# Patient Record
Sex: FEMALE | Race: WHITE | Employment: UNEMPLOYED | URBAN - METROPOLITAN AREA
[De-identification: names, ages, dates, MRNs, and addresses within clinical notes are randomized per-mention and may not be internally consistent; named-entity substitution may affect disease eponyms.]

---

## 2019-09-19 ENCOUNTER — TELEPHONE (OUTPATIENT)
Dept: ADMINISTRATIVE | Age: 74
End: 2019-09-19

## 2019-09-19 NOTE — TELEPHONE ENCOUNTER
Scheduled with dr Andreas Weiss on 10/9 at 3:15 pm for abdominal angina    Cardiac hx scanned  Referral complete  PCP faxing records

## 2019-10-01 ENCOUNTER — TELEPHONE (OUTPATIENT)
Dept: CARDIOLOGY CLINIC | Age: 74
End: 2019-10-01

## 2019-10-02 ENCOUNTER — TELEPHONE (OUTPATIENT)
Dept: CARDIOLOGY CLINIC | Age: 74
End: 2019-10-02

## 2019-11-13 ENCOUNTER — OFFICE VISIT (OUTPATIENT)
Dept: CARDIOLOGY CLINIC | Age: 74
End: 2019-11-13
Payer: COMMERCIAL

## 2019-11-13 VITALS
HEIGHT: 65 IN | SYSTOLIC BLOOD PRESSURE: 116 MMHG | BODY MASS INDEX: 29.99 KG/M2 | HEART RATE: 73 BPM | WEIGHT: 180 LBS | RESPIRATION RATE: 16 BRPM | DIASTOLIC BLOOD PRESSURE: 78 MMHG

## 2019-11-13 DIAGNOSIS — I10 ESSENTIAL HYPERTENSION: ICD-10-CM

## 2019-11-13 DIAGNOSIS — M54.50 CHRONIC MIDLINE LOW BACK PAIN WITHOUT SCIATICA: ICD-10-CM

## 2019-11-13 DIAGNOSIS — R07.9 CHEST PAIN, UNSPECIFIED TYPE: Primary | ICD-10-CM

## 2019-11-13 DIAGNOSIS — G89.29 CHRONIC MIDLINE LOW BACK PAIN WITHOUT SCIATICA: ICD-10-CM

## 2019-11-13 DIAGNOSIS — J41.0 SIMPLE CHRONIC BRONCHITIS (HCC): ICD-10-CM

## 2019-11-13 DIAGNOSIS — G89.29 CHRONIC RIGHT HIP PAIN: ICD-10-CM

## 2019-11-13 DIAGNOSIS — M79.7 FIBROMYALGIA: ICD-10-CM

## 2019-11-13 DIAGNOSIS — M25.551 CHRONIC RIGHT HIP PAIN: ICD-10-CM

## 2019-11-13 PROCEDURE — 99204 OFFICE O/P NEW MOD 45 MIN: CPT | Performed by: INTERNAL MEDICINE

## 2019-11-13 PROCEDURE — 93000 ELECTROCARDIOGRAM COMPLETE: CPT | Performed by: INTERNAL MEDICINE

## 2019-11-13 RX ORDER — AMLODIPINE BESYLATE 10 MG/1
10 TABLET ORAL DAILY
COMMUNITY

## 2019-11-13 RX ORDER — BRIMONIDINE TARTRATE 2 MG/ML
1 SOLUTION/ DROPS OPHTHALMIC 2 TIMES DAILY
COMMUNITY

## 2019-11-13 RX ORDER — LATANOPROST 50 UG/ML
1 SOLUTION/ DROPS OPHTHALMIC NIGHTLY
COMMUNITY

## 2019-11-13 RX ORDER — HYDROCODONE BITARTRATE 30 MG/1
30 TABLET, EXTENDED RELEASE ORAL EVERY EVENING
COMMUNITY

## 2019-11-13 RX ORDER — TIMOLOL MALEATE 6.8 MG/ML
1 SOLUTION/ DROPS OPHTHALMIC 2 TIMES DAILY
COMMUNITY

## 2019-11-13 RX ORDER — M-VIT,TX,IRON,MINS/CALC/FOLIC 27MG-0.4MG
1 TABLET ORAL DAILY
COMMUNITY

## 2019-11-14 ENCOUNTER — TELEPHONE (OUTPATIENT)
Dept: CARDIOLOGY CLINIC | Age: 74
End: 2019-11-14

## 2020-01-21 ENCOUNTER — TELEPHONE (OUTPATIENT)
Dept: CARDIOLOGY CLINIC | Age: 75
End: 2020-01-21

## 2020-01-21 NOTE — TELEPHONE ENCOUNTER
Chioma Duque, Cardiology Department @ [facility] called the office.   Patient was a no show for the Lexiscan stress test on 11/15/2019    I mailed a letter to patient to contact the hospital directly to reschedule the appointment  Ext [phone]

## 2020-11-02 ENCOUNTER — OFFICE VISIT (OUTPATIENT)
Dept: URBAN - METROPOLITAN AREA CLINIC 17 | Facility: CLINIC | Age: 75
End: 2020-11-02
Payer: MEDICARE

## 2020-11-02 PROCEDURE — 92133 CPTRZD OPH DX IMG PST SGM ON: CPT | Performed by: OPHTHALMOLOGY

## 2020-11-02 PROCEDURE — 76514 ECHO EXAM OF EYE THICKNESS: CPT | Performed by: OPHTHALMOLOGY

## 2020-11-02 PROCEDURE — 99204 OFFICE O/P NEW MOD 45 MIN: CPT | Performed by: OPHTHALMOLOGY

## 2020-11-02 PROCEDURE — 92020 GONIOSCOPY: CPT | Performed by: OPHTHALMOLOGY

## 2020-11-02 RX ORDER — LATANOPROST 50 UG/ML
0.005 % SOLUTION OPHTHALMIC
Qty: 1 | Refills: 11 | Status: INACTIVE
Start: 2020-11-02 | End: 2021-05-17

## 2020-11-02 RX ORDER — TIMOLOL MALEATE 5 MG/ML
0.5 % SOLUTION/ DROPS OPHTHALMIC
Qty: 1 | Refills: 11 | Status: INACTIVE
Start: 2020-11-02 | End: 2021-05-17

## 2020-11-02 RX ORDER — BRIMONIDINE TARTRATE 2 MG/ML
0.2 % SOLUTION/ DROPS OPHTHALMIC
Qty: 1 | Refills: 11 | Status: INACTIVE
Start: 2020-11-02 | End: 2021-05-17

## 2020-11-02 ASSESSMENT — INTRAOCULAR PRESSURE
OD: 15
OS: 10

## 2020-11-02 NOTE — IMPRESSION/PLAN
Impression: Chronic angle-closure glaucoma, right eye, severe stage: M74.2400. LPI OU Hx of ACG OD
CCT average OU
IOP/ONH stable OU Plan: Discussed diagnosis, explained and understood by patient. Discussed IOP/ONH/Glaucoma management and risks. OCT ordered and reviewed today. Continue timolol bid ou, brimonidine bid ou, and latanoprost qhs ou. Will continue to monitor condition and symptoms.

## 2021-01-25 ENCOUNTER — OFFICE VISIT (OUTPATIENT)
Dept: URBAN - METROPOLITAN AREA CLINIC 17 | Facility: CLINIC | Age: 76
End: 2021-01-25
Payer: MEDICARE

## 2021-01-25 DIAGNOSIS — H04.123 DRY EYE SYNDROME OF BILATERAL LACRIMAL GLANDS: ICD-10-CM

## 2021-01-25 PROCEDURE — 99213 OFFICE O/P EST LOW 20 MIN: CPT | Performed by: OPHTHALMOLOGY

## 2021-01-25 ASSESSMENT — INTRAOCULAR PRESSURE
OD: 16
OS: 12

## 2021-01-25 NOTE — IMPRESSION/PLAN
Impression: Chronic angle-closure glaucoma, right eye, severe stage: I29.8895. LPI OU Hx of ACG OD
CCT average OU
IOP/ONH stable OU Plan: Discussed diagnosis, explained and understood by patient. Discussed IOP/ONH/Glaucoma management and risks. Continue timolol bid ou, brimonidine bid ou, and latanoprost qhs ou. Will continue to monitor condition and symptoms.

## 2021-01-25 NOTE — IMPRESSION/PLAN
Impression: Dry eye syndrome of bilateral lacrimal glands: H04.123. Plan: Discussed diagnosis in detail with patient. Advised patient of condition. Will continue to observe condition and or symptoms. Patient instructed to use artificial tears as needed.

## 2021-03-17 ENCOUNTER — OFFICE VISIT (OUTPATIENT)
Dept: URBAN - METROPOLITAN AREA CLINIC 17 | Facility: CLINIC | Age: 76
End: 2021-03-17
Payer: MEDICARE

## 2021-03-17 DIAGNOSIS — Z96.1 PRESENCE OF INTRAOCULAR LENS: ICD-10-CM

## 2021-03-17 DIAGNOSIS — H40.2221 CHRONIC ANGLE-CLOSURE GLAUCOMA, LEFT EYE, MILD STAGE: ICD-10-CM

## 2021-03-17 PROCEDURE — 99204 OFFICE O/P NEW MOD 45 MIN: CPT | Performed by: OPTOMETRIST

## 2021-03-17 RX ORDER — ACYCLOVIR 800 MG/1
800 MG TABLET ORAL
Qty: 50 | Refills: 0 | Status: INACTIVE
Start: 2021-03-17 | End: 2021-03-26

## 2021-03-17 RX ORDER — GANCICLOVIR 1.5 MG/G
0.15 % GEL OPHTHALMIC
Qty: 5 | Refills: 0 | Status: INACTIVE
Start: 2021-03-17 | End: 2021-03-23

## 2021-03-17 ASSESSMENT — INTRAOCULAR PRESSURE
OS: 15
OD: 15

## 2021-03-17 NOTE — IMPRESSION/PLAN
Impression: Chronic angle-closure glaucoma, right eye, severe stage: T95.8199. LPI OU Plan: Continue timolol bid ou, brimonidine bid ou, and latanoprost qhs ou.  Continue care w/ Dr. Twan Fishman

## 2021-03-17 NOTE — IMPRESSION/PLAN
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 Plan: Discussed diagnosis in detail with patient. Start Acyclovir 800 mg PO x 5x/day for 10 days. Start Zirgan 1 gtt OS x 5x/day for 10 days.

## 2021-03-17 NOTE — IMPRESSION/PLAN
Impression: Chronic angle-closure glaucoma, left eye, mild stage: A81.7863.  Plan: see above assessment

## 2021-03-23 ENCOUNTER — OFFICE VISIT (OUTPATIENT)
Dept: URBAN - METROPOLITAN AREA CLINIC 17 | Facility: CLINIC | Age: 76
End: 2021-03-23
Payer: MEDICARE

## 2021-03-23 DIAGNOSIS — H20.011 PRIMARY IRIDOCYCLITIS, RIGHT EYE: ICD-10-CM

## 2021-03-23 PROCEDURE — 99214 OFFICE O/P EST MOD 30 MIN: CPT | Performed by: OPTOMETRIST

## 2021-03-23 RX ORDER — GANCICLOVIR 1.5 MG/G
0.15 % GEL OPHTHALMIC
Qty: 5 | Refills: 0 | Status: INACTIVE
Start: 2021-03-23 | End: 2021-04-12

## 2021-03-23 RX ORDER — OFLOXACIN 3 MG/ML
0.3 % SOLUTION/ DROPS OPHTHALMIC
Qty: 5 | Refills: 1 | Status: INACTIVE
Start: 2021-03-23 | End: 2021-04-12

## 2021-03-23 RX ORDER — TOBRAMYCIN AND DEXAMETHASONE 3; 1 MG/ML; MG/ML
SUSPENSION/ DROPS OPHTHALMIC
Qty: 5 | Refills: 1 | Status: INACTIVE
Start: 2021-03-23 | End: 2021-04-02

## 2021-03-23 ASSESSMENT — INTRAOCULAR PRESSURE
OS: 39
OD: 14

## 2021-03-23 NOTE — IMPRESSION/PLAN
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 Plan: Discussed diagnosis in detail with patient. Finish  Acyclovir 800 mg PO x 5x/day for 4 days. Continue Zirgan 1 gtt OS x 5x/day for 4 days. Start Ofloxacin QID OS. (Wrote down instructions for patient). Discussed possible bacterial infection. Swabbed left cornea in office and sent for labs.

## 2021-03-23 NOTE — IMPRESSION/PLAN
Impression: Chronic angle-closure glaucoma, left eye, mild stage: T29.1689.  Plan: see above assessment

## 2021-03-23 NOTE — IMPRESSION/PLAN
Impression: Chronic angle-closure glaucoma, right eye, severe stage: U76.4135. LPI OU Plan: Continue timolol bid ou, brimonidine bid ou, and latanoprost qhs ou.  Continue care w/ Dr. Lina Grace

## 2021-03-25 ENCOUNTER — OFFICE VISIT (OUTPATIENT)
Dept: URBAN - METROPOLITAN AREA CLINIC 17 | Facility: CLINIC | Age: 76
End: 2021-03-25
Payer: MEDICARE

## 2021-03-25 PROCEDURE — 99213 OFFICE O/P EST LOW 20 MIN: CPT | Performed by: OPTOMETRIST

## 2021-03-25 ASSESSMENT — INTRAOCULAR PRESSURE
OD: 16
OS: 20

## 2021-03-25 NOTE — IMPRESSION/PLAN
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 Plan: Discussed diagnosis in detail with patient. Finish  Acyclovir 800 mg PO x 5x/day for 2 days. Continue Zirgan 1 gtt OS x 5x/day for 2 days. Continue Ofloxacin QID OS. Emphasized compliance with meds. Pt to  remaining drops at the pharmacy today.

## 2021-03-25 NOTE — IMPRESSION/PLAN
Impression: Chronic angle-closure glaucoma, left eye, mild stage: F10.0421.  Plan: see above assessment

## 2021-03-25 NOTE — IMPRESSION/PLAN
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 Plan: Discussed diagnosis with patient. Start tobramycin-dexamethasone QID OU. Will continue to monitor.

## 2021-03-25 NOTE — IMPRESSION/PLAN
Impression: Chronic angle-closure glaucoma, right eye, severe stage: D54.0183. LPI OU Plan: Continue timolol bid ou, brimonidine bid ou, and latanoprost qhs ou.  Continue care w/ Dr. Joaquina Cowan

## 2021-03-29 ENCOUNTER — OFFICE VISIT (OUTPATIENT)
Dept: URBAN - METROPOLITAN AREA CLINIC 17 | Facility: CLINIC | Age: 76
End: 2021-03-29
Payer: MEDICARE

## 2021-03-29 DIAGNOSIS — H16.012 CENTRAL CORNEAL ULCER, LEFT EYE: ICD-10-CM

## 2021-03-29 PROCEDURE — 99213 OFFICE O/P EST LOW 20 MIN: CPT | Performed by: OPTOMETRIST

## 2021-03-29 ASSESSMENT — INTRAOCULAR PRESSURE: OD: 13

## 2021-03-29 NOTE — IMPRESSION/PLAN
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 Plan: Discussed diagnosis in detail with patient. D/C Acyclovir and Zirgan. Continue Ofloxacin QID OS. Emphasized compliance with meds. Reviewed Labs, will scan to chart.

## 2021-03-29 NOTE — IMPRESSION/PLAN
Impression: Chronic angle-closure glaucoma, right eye, severe stage: G94.9910. LPI OU Plan: Continue timolol bid ou, brimonidine bid ou, and latanoprost qhs ou.  Continue care w/ Dr. Marlowe Paget

## 2021-03-29 NOTE — IMPRESSION/PLAN
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 Plan: Discussed diagnosis with patient. Continue tobramycin-dexamethasone QID OU. Will continue to monitor.

## 2021-03-29 NOTE — IMPRESSION/PLAN
Impression: Chronic angle-closure glaucoma, left eye, mild stage: F44.3952.  Plan: see above assessment

## 2021-03-31 ENCOUNTER — OFFICE VISIT (OUTPATIENT)
Dept: URBAN - METROPOLITAN AREA CLINIC 17 | Facility: CLINIC | Age: 76
End: 2021-03-31
Payer: MEDICARE

## 2021-03-31 DIAGNOSIS — H20.032 SECONDARY INFECTIOUS IRIDOCYCLITIS, LEFT EYE: ICD-10-CM

## 2021-03-31 DIAGNOSIS — B00.52 HERPESVIRAL KERATITIS: Primary | ICD-10-CM

## 2021-03-31 PROCEDURE — 99213 OFFICE O/P EST LOW 20 MIN: CPT | Performed by: OPTOMETRIST

## 2021-03-31 ASSESSMENT — INTRAOCULAR PRESSURE: OD: 14

## 2021-03-31 NOTE — IMPRESSION/PLAN
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 Plan: Discussed diagnosis with patient. Continue tobramycin-dexamethasone QID OU.

## 2021-03-31 NOTE — IMPRESSION/PLAN
Impression: Chronic angle-closure glaucoma, left eye, mild stage: A05.3221.  Plan: see above assessment

## 2021-03-31 NOTE — IMPRESSION/PLAN
Impression: Chronic angle-closure glaucoma, right eye, severe stage: H06.5418. LPI OU Plan: Continue timolol bid ou, brimonidine bid ou, and latanoprost qhs ou.  Continue care w/ Dr. Brigitte Mutlani

## 2021-03-31 NOTE — IMPRESSION/PLAN
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 Plan: Discussed diagnosis in detail with patient. Continue Ofloxacin [id number] OS (alternate with tobramycin-dexamethasone drops) every 2 hours.

## 2021-04-01 ENCOUNTER — OFFICE VISIT (OUTPATIENT)
Dept: URBAN - METROPOLITAN AREA CLINIC 17 | Facility: CLINIC | Age: 76
End: 2021-04-01
Payer: MEDICARE

## 2021-04-01 DIAGNOSIS — H16.393: Primary | ICD-10-CM

## 2021-04-01 PROCEDURE — 99213 OFFICE O/P EST LOW 20 MIN: CPT | Performed by: OPHTHALMOLOGY

## 2021-04-01 RX ORDER — TOBRAMYCIN 3 MG/ML
0.3 % SOLUTION/ DROPS OPHTHALMIC
Qty: 10 | Refills: 3 | Status: INACTIVE
Start: 2021-04-01 | End: 2021-05-17

## 2021-04-01 RX ORDER — GATIFLOXACIN 5 MG/ML
0.5 % SOLUTION/ DROPS OPHTHALMIC
Qty: 2.5 | Refills: 3 | Status: INACTIVE
Start: 2021-04-01 | End: 2021-04-29

## 2021-04-01 ASSESSMENT — INTRAOCULAR PRESSURE: OS: 15

## 2021-04-01 NOTE — IMPRESSION/PLAN
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 Reviewed sensitivities, caused by Strep pheumo. Plan: Discussed diagnosis in detail with patient. Discussed lab culture with patient. Culture shows bacterial growth. Discontinue all glaucoma drops at this time in OS. Use ofloxacin q2h while awake. Begin gatifloxacin every other hr while awake & tobramycin every other hr while awake (alternating b/t gtts) - erx'd. Pt understand every hr she will be using gtts while she's awake. Pt understands residual scar may lead to future cornea transplant.

## 2021-04-06 ENCOUNTER — OFFICE VISIT (OUTPATIENT)
Dept: URBAN - METROPOLITAN AREA CLINIC 17 | Facility: CLINIC | Age: 76
End: 2021-04-06
Payer: MEDICARE

## 2021-04-06 PROCEDURE — 99213 OFFICE O/P EST LOW 20 MIN: CPT | Performed by: OPTOMETRIST

## 2021-04-06 ASSESSMENT — INTRAOCULAR PRESSURE: OD: 12

## 2021-04-06 NOTE — IMPRESSION/PLAN
Impression: Other deep keratitis of left eye: H16.392. Caused by Strep pneum Plan: Discussed diagnosis in detail with patient. Consulted with Dr. Bea Benítez over the phone patient to Continue ofloxacin q2h while awake and Continue gatifloxacin every other hr while awake & tobramycin every other hr while awake (alternating b/t gtts). Pt understand every hr she will be using gtts while she's awake.  
Patient to follow up on Thursday with OD and Friday with Dr. Bea Benítez

## 2021-04-08 ENCOUNTER — OFFICE VISIT (OUTPATIENT)
Dept: URBAN - METROPOLITAN AREA CLINIC 17 | Facility: CLINIC | Age: 76
End: 2021-04-08
Payer: MEDICARE

## 2021-04-08 PROCEDURE — 99213 OFFICE O/P EST LOW 20 MIN: CPT | Performed by: OPTOMETRIST

## 2021-04-08 RX ORDER — PREDNISOLONE ACETATE 10 MG/ML
1 % SUSPENSION/ DROPS OPHTHALMIC
Qty: 5 | Refills: 0 | Status: INACTIVE
Start: 2021-04-08 | End: 2021-04-20

## 2021-04-08 ASSESSMENT — INTRAOCULAR PRESSURE: OD: 15

## 2021-04-08 NOTE — IMPRESSION/PLAN
Impression: Chronic angle-closure glaucoma, left eye, mild stage: P05.5695.  Plan: see above assessment

## 2021-04-08 NOTE — IMPRESSION/PLAN
Impression: Other deep keratitis of left eye: H16.392. Caused by Streptococcus pneumoniae Plan: Discussed diagnosis in detail with patient. Patient to Continue ofloxacin q2h in left eye while awake and Continue gatifloxacin every other hr while awake & tobramycin every other hr while awake (alternating b/t gtts). Pt understand every hr she will be using gtts while she's awake.  Stay off Glaucoma drops in the left eye

## 2021-04-08 NOTE — IMPRESSION/PLAN
Impression: Chronic angle-closure glaucoma, right eye, severe stage: E93.0830. LPI OU Plan: Continue timolol bid ou, brimonidine bid OD, and latanoprost qhs oD.  Continue care w/ Dr. Shreyas Sweeney

## 2021-04-09 ENCOUNTER — OFFICE VISIT (OUTPATIENT)
Dept: URBAN - METROPOLITAN AREA CLINIC 17 | Facility: CLINIC | Age: 76
End: 2021-04-09
Payer: MEDICARE

## 2021-04-09 PROCEDURE — 99213 OFFICE O/P EST LOW 20 MIN: CPT | Performed by: OPHTHALMOLOGY

## 2021-04-09 ASSESSMENT — INTRAOCULAR PRESSURE
OS: 15
OD: 15

## 2021-04-09 NOTE — IMPRESSION/PLAN
Impression: Other deep keratitis of left eye: H16.392. Caused by Streptococcus pneumoniae Plan: Discussed diagnosis in detail with patient. Patient to Continue gatifloxacin every other hr while awake & tobramycin every other hr while awake both OS only (alternating b/t gtts). Pt understand every hr she will be using gtts while she's awake. Stay off Glaucoma drops in the left eye. Pt to start Pred ace QID OS. D/C Ofloxacin New culture taken today.

## 2021-04-12 ENCOUNTER — OFFICE VISIT (OUTPATIENT)
Dept: URBAN - METROPOLITAN AREA CLINIC 17 | Facility: CLINIC | Age: 76
End: 2021-04-12
Payer: MEDICARE

## 2021-04-12 PROCEDURE — 99213 OFFICE O/P EST LOW 20 MIN: CPT | Performed by: OPHTHALMOLOGY

## 2021-04-12 ASSESSMENT — INTRAOCULAR PRESSURE
OS: 7
OD: 14

## 2021-04-12 NOTE — IMPRESSION/PLAN
Impression: Other deep keratitis of left eye: H16.392. Caused by Streptococcus pneumoniae Plan: Discussed diagnosis in detail with patient. Patient to Continue gatifloxacin every other hr while awake & tobramycin every other hr while awake both OS only (alternating b/t gtts). Pt understand every hr she will be using gtts while she's awake. Continue Pred ace QID OS only. Stay off Glaucoma drops in the left eye. decrease pred ace in OD gradually until she is no longer on drops.

## 2021-04-15 ENCOUNTER — OFFICE VISIT (OUTPATIENT)
Dept: URBAN - METROPOLITAN AREA CLINIC 17 | Facility: CLINIC | Age: 76
End: 2021-04-15
Payer: MEDICARE

## 2021-04-15 PROCEDURE — 99213 OFFICE O/P EST LOW 20 MIN: CPT | Performed by: OPHTHALMOLOGY

## 2021-04-15 ASSESSMENT — INTRAOCULAR PRESSURE: OD: 14

## 2021-04-15 NOTE — IMPRESSION/PLAN
Impression: Other deep keratitis of left eye: H16.392. Caused by Streptococcus pneumoniae Plan: Discussed diagnosis in detail with patient. Patient to decrease gatifloxacin to QID OS & decrease tobramycin QID OS. Continue Pred ace QID OS only. Stay off Glaucoma drops in the left eye.  D/C Pred ace in OD now, continue glaucoma drops in OD only (brimonidine, latanoprost, Timolol)

## 2021-04-23 ENCOUNTER — OFFICE VISIT (OUTPATIENT)
Dept: URBAN - METROPOLITAN AREA CLINIC 17 | Facility: CLINIC | Age: 76
End: 2021-04-23
Payer: MEDICARE

## 2021-04-23 PROCEDURE — 99213 OFFICE O/P EST LOW 20 MIN: CPT | Performed by: OPHTHALMOLOGY

## 2021-04-23 ASSESSMENT — INTRAOCULAR PRESSURE: OD: 9

## 2021-04-23 NOTE — IMPRESSION/PLAN
Impression: Other deep keratitis of left eye: H16.392. Caused by Streptococcus pneumoniae
improving slowly Plan: Discussed diagnosis in detail with patient. Patient to continue gatifloxacin QID OS, tobramycin QID OS and Pred ace QID OS.   Stay off Glaucoma drops in the left eye. continue glaucoma drops in OD only (brimonidine, latanoprost, Timolol)

## 2021-05-07 ENCOUNTER — OFFICE VISIT (OUTPATIENT)
Dept: URBAN - METROPOLITAN AREA CLINIC 17 | Facility: CLINIC | Age: 76
End: 2021-05-07
Payer: MEDICARE

## 2021-05-07 PROCEDURE — 99213 OFFICE O/P EST LOW 20 MIN: CPT | Performed by: OPHTHALMOLOGY

## 2021-05-07 ASSESSMENT — INTRAOCULAR PRESSURE
OS: 12
OD: 20

## 2021-05-07 NOTE — IMPRESSION/PLAN
Impression: Other deep keratitis of left eye: H16.392. Caused by Streptococcus pneumoniae
improving slowly Plan: Discussed diagnosis in detail with patient. slow improvement seen. Patient to continue gatifloxacin QID OS and Pred ace BID OS. D/C  tobramycin QID OS. Stay off Glaucoma drops in the left eye. continue glaucoma drops in OD only (brimonidine, latanoprost, Timolol) Would recommend seeing corneal specialist to discuss options with corneal transplant.

## 2021-05-17 ENCOUNTER — OFFICE VISIT (OUTPATIENT)
Dept: URBAN - METROPOLITAN AREA CLINIC 17 | Facility: CLINIC | Age: 76
End: 2021-05-17
Payer: MEDICARE

## 2021-05-17 PROCEDURE — 92083 EXTENDED VISUAL FIELD XM: CPT | Performed by: OPHTHALMOLOGY

## 2021-05-17 PROCEDURE — 99214 OFFICE O/P EST MOD 30 MIN: CPT | Performed by: OPHTHALMOLOGY

## 2021-05-17 RX ORDER — BRIMONIDINE TARTRATE 2 MG/ML
0.2 % SOLUTION/ DROPS OPHTHALMIC
Qty: 15 | Refills: 11 | Status: INACTIVE
Start: 2021-05-17 | End: 2021-11-12

## 2021-05-17 RX ORDER — TIMOLOL MALEATE 5 MG/ML
0.5 % SOLUTION/ DROPS OPHTHALMIC
Qty: 1 | Refills: 11 | Status: INACTIVE
Start: 2021-05-17 | End: 2021-12-13

## 2021-05-17 RX ORDER — LATANOPROST 50 UG/ML
0.005 % SOLUTION OPHTHALMIC
Qty: 5 | Refills: 4 | Status: INACTIVE
Start: 2021-05-17 | End: 2021-12-13

## 2021-05-17 ASSESSMENT — INTRAOCULAR PRESSURE: OD: 24

## 2021-05-17 NOTE — IMPRESSION/PLAN
Impression: Chronic angle-closure glaucoma, right eye, severe stage: G01.4361. LPI OU Hx of ACG OD
CCT average OU
IOP/ONH  elevated OD Plan: Discussed diagnosis, explained and understood by patient. Discussed IOP/ONH/Glaucoma management and risks. VF ordered and reviewed today. Increase brimonidine tid od, continue timolol bid od and latanoprost qhs od. Will continue to monitor condition and symptoms.

## 2021-05-17 NOTE — IMPRESSION/PLAN
Impression: Other deep keratitis of left eye: H16.392. Caused by Streptococcus pneumoniae
improving slowly Plan: Discussed diagnosis in detail with patient. slow improvement seen. Patient to continue gatifloxacin QID OS and Pred ace BID OS. Stay off Glaucoma drops in the left eye.

## 2021-05-27 ENCOUNTER — OFFICE VISIT (OUTPATIENT)
Dept: URBAN - METROPOLITAN AREA CLINIC 17 | Facility: CLINIC | Age: 76
End: 2021-05-27
Payer: MEDICARE

## 2021-05-27 PROCEDURE — 99213 OFFICE O/P EST LOW 20 MIN: CPT | Performed by: OPHTHALMOLOGY

## 2021-05-27 ASSESSMENT — INTRAOCULAR PRESSURE
OD: 14
OS: 14

## 2021-05-27 NOTE — IMPRESSION/PLAN
Impression: Chronic angle-closure glaucoma, right eye, severe stage: Y93.8509. LPI OU Hx of ACG OD
CCT average OU
IOP/ONH  elevated OD Plan: Discussed diagnosis, explained and understood by patient. Cont brimonidine tid od, continue timolol bid od and latanoprost qhs od. Will continue to monitor condition and symptoms.

## 2021-06-18 ENCOUNTER — OFFICE VISIT (OUTPATIENT)
Dept: URBAN - METROPOLITAN AREA CLINIC 17 | Facility: CLINIC | Age: 76
End: 2021-06-18
Payer: MEDICARE

## 2021-06-18 DIAGNOSIS — H16.221 KERATOCONJUNCTIVITIS SICCA OF RIGHT EYE, NOT SPECIFIED AS SJOGREN'S: ICD-10-CM

## 2021-06-18 DIAGNOSIS — H16.392: Primary | ICD-10-CM

## 2021-06-18 PROCEDURE — 99213 OFFICE O/P EST LOW 20 MIN: CPT | Performed by: OPHTHALMOLOGY

## 2021-06-18 RX ORDER — LOTEPREDNOL ETABONATE AND TOBRAMYCIN 5; 3 MG/ML; MG/ML
SUSPENSION/ DROPS OPHTHALMIC
Qty: 5 | Refills: 1 | Status: ACTIVE
Start: 2021-06-18

## 2021-06-18 ASSESSMENT — INTRAOCULAR PRESSURE
OS: 15
OD: 10

## 2021-06-18 NOTE — IMPRESSION/PLAN
Impression: Other deep keratitis of left eye: H16.392. Caused by Streptococcus pneumoniae
improving slowly Plan: Discussed diagnosis in detail with patient. slow improvement seen. Patient to Decrease gatifloxacin  to BID from QID OS and Pred ace QD from BID OS. Can restart Latanoprost in OS now (QHS OU).  Pt to continue drops this way unless told different by Dr Nelson Ayoub

## 2021-06-18 NOTE — IMPRESSION/PLAN
Impression: Keratoconjunctivitis sicca of right eye, not specified as Sjogren's: H16.221. Plan: Advised patient of condition. Discussed diagnosis in detail with patient.  Recommend using Artificial tears at least Q2H while awake and Zylet BID OD only

## 2021-06-18 NOTE — IMPRESSION/PLAN
Impression: Chronic angle-closure glaucoma, right eye, severe stage: Z40.6158. LPI OU Hx of ACG OD
CCT average OU
IOP/ONH  elevated OD Plan: Discussed diagnosis, explained and understood by patient. Cont brimonidine tid od, continue timolol bid od and latanoprost qhs OU now. Will continue to monitor condition and symptoms.

## 2021-07-21 ENCOUNTER — OFFICE VISIT (OUTPATIENT)
Dept: URBAN - METROPOLITAN AREA CLINIC 44 | Facility: CLINIC | Age: 76
End: 2021-07-21
Payer: MEDICARE

## 2021-07-21 DIAGNOSIS — H40.2213 CHRONIC ANGLE-CLOSURE GLAUCOMA, RIGHT EYE, SEVERE STAGE: ICD-10-CM

## 2021-07-21 DIAGNOSIS — H17.12 CENTRAL CORNEAL OPACITY OF LEFT EYE: Primary | ICD-10-CM

## 2021-07-21 PROCEDURE — 92025 CPTRIZED CORNEAL TOPOGRAPHY: CPT | Performed by: OPHTHALMOLOGY

## 2021-07-21 PROCEDURE — 76514 ECHO EXAM OF EYE THICKNESS: CPT | Performed by: OPHTHALMOLOGY

## 2021-07-21 PROCEDURE — 99204 OFFICE O/P NEW MOD 45 MIN: CPT | Performed by: OPHTHALMOLOGY

## 2021-07-21 ASSESSMENT — INTRAOCULAR PRESSURE
OD: 15
OS: 18

## 2021-07-21 NOTE — IMPRESSION/PLAN
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 Plan: Onset 3/2021, originally suspected to be HSVK with bacterial superinfection, culture revealed  S.pneumoniae, resolved with scar.

## 2021-07-21 NOTE — IMPRESSION/PLAN
Impression: Chronic angle-closure glaucoma, right eye, severe stage: C00.5296. LPI OU Hx of ACG OD
CCT average OU
IOP/ONH  elevated OD Plan: Managed at [facility]. Cont brimonidine tid od, continue timolol bid od and latanoprost qhs OU now. Will continue to monitor condition and symptoms. Understands that vision will be limited by glaucoma, and there is a risk of worsening glaucoma after PKP.

## 2021-07-21 NOTE — IMPRESSION/PLAN
Impression: Central corneal opacity of left eye: H17.12. Plan: I would recommend PKP surgery. Visual rehabilitation can take a year or longer. Risks of Keratoplasty include similar risks to any intraocular surgery such as bleeding, cataract, and infection. Risks include rejection, need for additional surgery, need for glasses after, and the risks from the medications used. Steroids should not be stopped without instruction by a doctor. Information packet given. Questions answered. Pt desires PKP, schedule PKP OS approx 2-3 months from now to allow further decrease in ocular inflammation to ensure success of graft. If kidneys can tolerate (h/o AKD 2/2 COVID), may need prophylactic ACV 400mg 5 days before and 5 days after PKP.

## 2021-08-18 ENCOUNTER — OFFICE VISIT (OUTPATIENT)
Dept: URBAN - METROPOLITAN AREA CLINIC 17 | Facility: CLINIC | Age: 76
End: 2021-08-18
Payer: MEDICARE

## 2021-08-18 PROCEDURE — 99213 OFFICE O/P EST LOW 20 MIN: CPT | Performed by: OPHTHALMOLOGY

## 2021-08-18 ASSESSMENT — INTRAOCULAR PRESSURE
OD: 16
OS: 17

## 2021-08-18 NOTE — IMPRESSION/PLAN
Impression: Chronic angle-closure glaucoma, right eye, severe stage: Q74.0256. LPI OU Hx of ACG OD
CCT average OU
IOP/ONH  stable OU Plan: Discussed diagnosis, explained and understood by patient. Discussed IOP/ONH/Glaucoma management and risks. Continue brimonidine tid od, timolol bid od and latanoprost qhs od. Will continue to monitor condition and symptoms.

## 2021-10-04 ENCOUNTER — ADULT PHYSICAL (OUTPATIENT)
Dept: URBAN - METROPOLITAN AREA CLINIC 24 | Facility: CLINIC | Age: 76
End: 2021-10-04
Payer: MEDICARE

## 2021-10-04 DIAGNOSIS — Z01.818 ENCOUNTER FOR OTHER PREPROCEDURAL EXAMINATION: Primary | ICD-10-CM

## 2021-10-04 PROCEDURE — 99203 OFFICE O/P NEW LOW 30 MIN: CPT | Performed by: PHYSICIAN ASSISTANT

## 2021-10-04 RX ORDER — KETOROLAC TROMETHAMINE 5 MG/ML
0.5 % SOLUTION OPHTHALMIC
Qty: 10 | Refills: 0 | Status: ACTIVE
Start: 2021-10-04

## 2021-10-04 RX ORDER — OFLOXACIN 3 MG/ML
0.3 % SOLUTION/ DROPS OPHTHALMIC
Qty: 10 | Refills: 0 | Status: ACTIVE
Start: 2021-10-04

## 2021-10-04 RX ORDER — MILNACIPRAN HYDROCHLORIDE 50 MG/1
50 MG TABLET, FILM COATED ORAL
Qty: 0 | Refills: 0 | Status: ACTIVE
Start: 2021-10-04

## 2021-10-04 RX ORDER — PREDNISOLONE ACETATE 10 MG/ML
1 % SUSPENSION/ DROPS OPHTHALMIC
Qty: 10 | Refills: 0 | Status: INACTIVE
Start: 2021-10-04 | End: 2021-11-11

## 2021-10-04 RX ORDER — HYDROCODONE BITARTRATE 30 MG/1
30 MG TABLET, EXTENDED RELEASE ORAL
Qty: 0 | Refills: 0 | Status: ACTIVE
Start: 2021-10-04

## 2021-10-18 ENCOUNTER — SURGERY (OUTPATIENT)
Dept: URBAN - METROPOLITAN AREA SURGERY 19 | Facility: SURGERY | Age: 76
End: 2021-10-18
Payer: MEDICARE

## 2021-10-18 PROCEDURE — 65730 CORNEAL TRANSPLANT: CPT | Performed by: OPHTHALMOLOGY

## 2021-10-18 RX ORDER — ACETAMINOPHEN AND CODEINE PHOSPHATE 300; 30 MG/1; MG/1
TABLET ORAL
Qty: 10 | Refills: 0 | Status: ACTIVE
Start: 2021-10-18

## 2021-10-19 ENCOUNTER — POST-OPERATIVE VISIT (OUTPATIENT)
Dept: URBAN - METROPOLITAN AREA CLINIC 24 | Facility: CLINIC | Age: 76
End: 2021-10-19
Payer: MEDICARE

## 2021-10-19 DIAGNOSIS — Z48.810 ENCOUNTER FOR SURGICAL AFTERCARE FOLLOWING SURGERY ON THE SENSE ORGANS: Primary | ICD-10-CM

## 2021-10-19 PROCEDURE — 99024 POSTOP FOLLOW-UP VISIT: CPT | Performed by: OPTOMETRIST

## 2021-10-19 ASSESSMENT — INTRAOCULAR PRESSURE: OS: 15

## 2021-10-19 NOTE — IMPRESSION/PLAN
Impression: S/P Penetrating keratoplasty OS - 1 Day. Encounter for surgical aftercare following surgery on a sense organ  Z48.810. Excellent post op course   Condition is improving - Plan: In operative eye OS; continue pred 1% and ofloxacin QID as directed Continue ACV 400mg PO 5x/day as directed

## 2021-10-26 ENCOUNTER — OFFICE VISIT (OUTPATIENT)
Dept: URBAN - METROPOLITAN AREA CLINIC 44 | Facility: CLINIC | Age: 76
End: 2021-10-26
Payer: MEDICARE

## 2021-10-26 PROCEDURE — 99024 POSTOP FOLLOW-UP VISIT: CPT | Performed by: OPHTHALMOLOGY

## 2021-10-26 RX ORDER — ACYCLOVIR 400 MG/1
400 MG TABLET ORAL
Qty: 30 | Refills: 2 | Status: INACTIVE
Start: 2021-10-26 | End: 2022-02-01

## 2021-10-26 ASSESSMENT — INTRAOCULAR PRESSURE: OS: 13

## 2021-11-10 ENCOUNTER — OFFICE VISIT (OUTPATIENT)
Dept: URBAN - METROPOLITAN AREA CLINIC 44 | Facility: CLINIC | Age: 76
End: 2021-11-10
Payer: MEDICARE

## 2021-11-10 PROCEDURE — 99024 POSTOP FOLLOW-UP VISIT: CPT | Performed by: OPHTHALMOLOGY

## 2021-11-10 RX ORDER — ERYTHROMYCIN 5 MG/G
OINTMENT OPHTHALMIC
Qty: 1 | Refills: 1 | Status: INACTIVE
Start: 2021-11-10 | End: 2022-01-26

## 2021-11-10 ASSESSMENT — INTRAOCULAR PRESSURE
OD: 14
OS: 10

## 2021-11-10 NOTE — IMPRESSION/PLAN
Impression: Corneal transplant status: Z94.7.
- s/p PKP OS 10/18/21 Plan: POW#1, doing well. Edema resolving. Surface dryness. Cont pred QID Cont ATs QID Start erythro jace qhs, aggressive lubrication Cont ACV 400mg qd. Precautions reviewed.

## 2021-12-07 ENCOUNTER — OFFICE VISIT (OUTPATIENT)
Dept: URBAN - METROPOLITAN AREA CLINIC 44 | Facility: CLINIC | Age: 76
End: 2021-12-07
Payer: MEDICARE

## 2021-12-07 PROCEDURE — 99024 POSTOP FOLLOW-UP VISIT: CPT | Performed by: OPHTHALMOLOGY

## 2021-12-07 ASSESSMENT — INTRAOCULAR PRESSURE: OS: 18

## 2021-12-07 NOTE — IMPRESSION/PLAN
Impression: Chronic angle-closure glaucoma, right eye, severe stage: M62.1788. LPI OU Hx of ACG OD
CCT average OU
IOP/ONH  stable OU Plan: Desires to establish care with Dr. Cristina Huynh.

## 2021-12-08 ENCOUNTER — OFFICE VISIT (OUTPATIENT)
Dept: URBAN - METROPOLITAN AREA CLINIC 17 | Facility: CLINIC | Age: 76
End: 2021-12-08
Payer: MEDICARE

## 2021-12-08 PROCEDURE — 99213 OFFICE O/P EST LOW 20 MIN: CPT | Performed by: OPHTHALMOLOGY

## 2021-12-08 ASSESSMENT — INTRAOCULAR PRESSURE
OS: 14
OD: 16

## 2021-12-08 NOTE — IMPRESSION/PLAN
Impression: Chronic angle-closure glaucoma, left eye, mild stage: M55.4226. limited vision Plan: see note #1

## 2021-12-08 NOTE — IMPRESSION/PLAN
Impression: Chronic angle-closure glaucoma, right eye, severe stage: E09.5944. LPI OU Hx of ACG OD
CCT average OU
IOP/ONH  stable OU Plan: Discussed diagnosis, explained and understood by patient. Discussed IOP/ONH/Glaucoma management and risks. Continue brimonidine tid od, timolol bid od and latanoprost qhs od. No drops needed OS. Will continue to monitor condition and symptoms.

## 2022-02-01 ENCOUNTER — OFFICE VISIT (OUTPATIENT)
Dept: URBAN - METROPOLITAN AREA CLINIC 44 | Facility: CLINIC | Age: 77
End: 2022-02-01
Payer: MEDICARE

## 2022-02-01 PROCEDURE — 99213 OFFICE O/P EST LOW 20 MIN: CPT | Performed by: OPHTHALMOLOGY

## 2022-02-01 RX ORDER — ACYCLOVIR 400 MG/1
400 MG TABLET ORAL
Qty: 30 | Refills: 12 | Status: ACTIVE
Start: 2022-02-01

## 2022-02-01 RX ORDER — LATANOPROST 50 UG/ML
0.005 % SOLUTION OPHTHALMIC
Qty: 5 | Refills: 1 | Status: ACTIVE
Start: 2022-02-01

## 2022-02-01 RX ORDER — ERYTHROMYCIN 5 MG/G
OINTMENT OPHTHALMIC
Qty: 3.5 | Refills: 2 | Status: ACTIVE
Start: 2022-02-01

## 2022-02-01 ASSESSMENT — INTRAOCULAR PRESSURE
OS: 14
OD: 15

## 2022-02-01 NOTE — IMPRESSION/PLAN
Impression: Chronic angle-closure glaucoma, right eye, severe stage: V84.0287. LPI OU Hx of ACG OD
CCT average OU
IOP/ONH  stable OU Plan: Under the care of DR. Мария Fontaine

## 2022-02-01 NOTE — IMPRESSION/PLAN
Impression: Corneal transplant status: Z94.7.
- s/p PKP OS 10/18/21 Plan: POM#3.5, doing well. Cont pred TID until 2/18 then decrease to BID, do not stop. Cont ATs QID Cont erythro jace qhs, aggressive lubrication Resume ACV 400mg qd, h/o several oral cold sores

## 2022-04-04 ENCOUNTER — OFFICE VISIT (OUTPATIENT)
Dept: URBAN - METROPOLITAN AREA CLINIC 17 | Facility: CLINIC | Age: 77
End: 2022-04-04
Payer: MEDICARE

## 2022-04-04 DIAGNOSIS — Z94.7 CORNEAL TRANSPLANT STATUS: ICD-10-CM

## 2022-04-04 PROCEDURE — 99213 OFFICE O/P EST LOW 20 MIN: CPT | Performed by: OPTOMETRIST

## 2022-04-04 ASSESSMENT — INTRAOCULAR PRESSURE
OS: 19
OD: 18

## 2022-04-04 NOTE — IMPRESSION/PLAN
Impression: Chronic angle-closure glaucoma, left eye, mild stage: X17.3422. limited vision Plan: see note #1

## 2022-04-04 NOTE — IMPRESSION/PLAN
Impression: Chronic angle-closure glaucoma, right eye, severe stage: U07.5344. LPI OU Hx of ACG OD
CCT average OU
IOP/ONH  stable OU Plan: Discussed diagnosis, explained and understood by patient. Continue brimonidine tid od, timolol bid od and latanoprost qhs od. No drops needed OS. Will continue to monitor condition and symptoms.

## 2022-04-04 NOTE — IMPRESSION/PLAN
Impression: Corneal transplant status: Z94.7.
- s/p PKP OS 10/18/21 Plan: Pred ace TID OS. Continue care with Dr. Erickson Wiggins.

## 2022-04-12 ENCOUNTER — OFFICE VISIT (OUTPATIENT)
Dept: URBAN - METROPOLITAN AREA CLINIC 44 | Facility: CLINIC | Age: 77
End: 2022-04-12
Payer: MEDICARE

## 2022-04-12 DIAGNOSIS — H40.2213 CHRONIC ANGLE-CLOSURE GLAUCOMA, RIGHT EYE, SEVERE STAGE: ICD-10-CM

## 2022-04-12 DIAGNOSIS — Z94.7 CORNEAL TRANSPLANT STATUS: Primary | ICD-10-CM

## 2022-04-12 DIAGNOSIS — H40.2221 CHRONIC ANGLE-CLOSURE GLAUCOMA, LEFT EYE, MILD STAGE: ICD-10-CM

## 2022-04-12 PROCEDURE — 92025 CPTRIZED CORNEAL TOPOGRAPHY: CPT | Performed by: OPHTHALMOLOGY

## 2022-04-12 PROCEDURE — 99213 OFFICE O/P EST LOW 20 MIN: CPT | Performed by: OPHTHALMOLOGY

## 2022-04-12 ASSESSMENT — INTRAOCULAR PRESSURE
OS: 20
OD: 18

## 2022-04-12 NOTE — IMPRESSION/PLAN
Impression: Chronic angle-closure glaucoma, left eye, mild stage: U27.4513. limited vision Plan: see note #2

## 2022-04-12 NOTE — IMPRESSION/PLAN
Impression: Corneal transplant status: Z94.7.
- s/p PKP OS 10/18/21 Plan: POM#6, doing well. Cont pred BID, do not stop (if IOPs can tolerate), will decrease to qd nv. Cont ATs QID Cont erythro/lubricating jace qhs, aggressive lubrication Doing well of ACV, monitor while off.

## 2022-06-27 ENCOUNTER — OFFICE VISIT (OUTPATIENT)
Dept: URBAN - METROPOLITAN AREA CLINIC 44 | Facility: CLINIC | Age: 77
End: 2022-06-27
Payer: MEDICARE

## 2022-06-27 DIAGNOSIS — H40.2213 CHRONIC ANGLE-CLOSURE GLAUCOMA, RIGHT EYE, SEVERE STAGE: ICD-10-CM

## 2022-06-27 DIAGNOSIS — Z94.7 CORNEAL TRANSPLANT STATUS: Primary | ICD-10-CM

## 2022-06-27 DIAGNOSIS — H40.2221 CHRONIC ANGLE-CLOSURE GLAUCOMA, LEFT EYE, MILD STAGE: ICD-10-CM

## 2022-06-27 PROCEDURE — 99213 OFFICE O/P EST LOW 20 MIN: CPT | Performed by: OPHTHALMOLOGY

## 2022-06-27 PROCEDURE — 92025 CPTRIZED CORNEAL TOPOGRAPHY: CPT | Performed by: OPHTHALMOLOGY

## 2022-06-27 RX ORDER — OFLOXACIN 3 MG/ML
0.3 % SOLUTION/ DROPS OPHTHALMIC
Qty: 5 | Refills: 3 | Status: ACTIVE
Start: 2022-06-27

## 2022-06-27 ASSESSMENT — INTRAOCULAR PRESSURE
OS: 17
OD: 13

## 2022-06-27 NOTE — IMPRESSION/PLAN
Impression: Chronic angle-closure glaucoma, left eye, mild stage: I54.4393. limited vision Plan: see note #2

## 2022-06-27 NOTE — IMPRESSION/PLAN
Impression: Chronic angle-closure glaucoma, right eye, severe stage: O09.0457. LPI OU Hx of ACG OD
CCT average OU
IOP/ONH  stable OU Plan: Under the care of DR. Jules Bernal, keep all appts. Per Dr. Jules Bernal: Continue brimonidine tid od, timolol bid od and latanoprost qhs od. No drops needed OS.

## 2022-06-27 NOTE — IMPRESSION/PLAN
Impression: Corneal transplant status: Z94.7.
- s/p PKP OS 10/18/21 Plan: POM#9, doing well. 1 suture removed at 6:00, oflox placed. Cont oflox TID x 3 days then d/c. Cont pred BID x 3 days then decrase to qd, do not stop (if IOPs can tolerate) Cont ATs QID Cont erythro/lubricating jace qhs, aggressive lubrication Doing well of ACV, monitor while off.

## 2022-08-16 ENCOUNTER — OFFICE VISIT (OUTPATIENT)
Dept: URBAN - METROPOLITAN AREA CLINIC 44 | Facility: CLINIC | Age: 77
End: 2022-08-16
Payer: MEDICARE

## 2022-08-16 DIAGNOSIS — Z94.7 CORNEAL TRANSPLANT STATUS: Primary | ICD-10-CM

## 2022-08-16 DIAGNOSIS — H40.2221 CHRONIC ANGLE-CLOSURE GLAUCOMA, LEFT EYE, MILD STAGE: ICD-10-CM

## 2022-08-16 DIAGNOSIS — H40.2213 CHRONIC ANGLE-CLOSURE GLAUCOMA, RIGHT EYE, SEVERE STAGE: ICD-10-CM

## 2022-08-16 PROCEDURE — 99213 OFFICE O/P EST LOW 20 MIN: CPT | Performed by: OPHTHALMOLOGY

## 2022-08-16 ASSESSMENT — INTRAOCULAR PRESSURE
OD: 15
OS: 15

## 2022-08-16 NOTE — IMPRESSION/PLAN
Impression: Chronic angle-closure glaucoma, left eye, mild stage: X39.9908. limited vision Plan: see note #2

## 2022-08-16 NOTE — IMPRESSION/PLAN
Impression: Chronic angle-closure glaucoma, right eye, severe stage: C21.4841. LPI OU Hx of ACG OD
CCT average OU
IOP/ONH  stable OU Plan: Under the care of DR. Marion Mcclain, keep all appts. Per Dr. Marion Mcclain: Continue brimonidine tid od, timolol bid od and latanoprost qhs od. No drops needed OS.

## 2022-08-16 NOTE — IMPRESSION/PLAN
Impression: Corneal transplant status: Z94.7.
- s/p PKP OS 10/18/21 Plan: POM#10, doing well. 2 sutures removed, oflox placed. Cont oflox TID x 3 days then d/c. Cont pred qd, do not stop (if IOPs can tolerate) Cont ATs QID Cont erythro/lubricating jace qhs, aggressive lubrication Doing well of ACV, monitor while off.

## 2022-10-03 ENCOUNTER — OFFICE VISIT (OUTPATIENT)
Dept: URBAN - METROPOLITAN AREA CLINIC 17 | Facility: CLINIC | Age: 77
End: 2022-10-03
Payer: MEDICARE

## 2022-10-03 DIAGNOSIS — H40.2213 CHRONIC ANGLE-CLOSURE GLAUCOMA, RIGHT EYE, SEVERE STAGE: Primary | ICD-10-CM

## 2022-10-03 DIAGNOSIS — Z94.7 CORNEAL TRANSPLANT STATUS: ICD-10-CM

## 2022-10-03 PROCEDURE — 99213 OFFICE O/P EST LOW 20 MIN: CPT | Performed by: OPTOMETRIST

## 2022-10-03 PROCEDURE — 92083 EXTENDED VISUAL FIELD XM: CPT | Performed by: OPTOMETRIST

## 2022-10-03 RX ORDER — BRIMONIDINE TARTRATE 2 MG/ML
0.2 % SOLUTION/ DROPS OPHTHALMIC
Qty: 15 | Refills: 6 | Status: ACTIVE
Start: 2022-10-03

## 2022-10-03 RX ORDER — TIMOLOL MALEATE 5 MG/ML
0.5 % SOLUTION/ DROPS OPHTHALMIC
Qty: 1 | Refills: 11 | Status: ACTIVE
Start: 2022-10-03

## 2022-10-03 RX ORDER — PREDNISOLONE ACETATE 10 MG/ML
1 % SUSPENSION/ DROPS OPHTHALMIC
Qty: 10 | Refills: 5 | Status: ACTIVE
Start: 2022-10-03

## 2022-10-03 RX ORDER — LATANOPROST 50 UG/ML
0.005 % SOLUTION OPHTHALMIC
Qty: 5 | Refills: 1 | Status: ACTIVE
Start: 2022-10-03

## 2022-10-03 ASSESSMENT — INTRAOCULAR PRESSURE
OS: 12
OD: 17

## 2022-10-03 NOTE — IMPRESSION/PLAN
Impression: Corneal transplant status: Z94.7. Left. - s/p PKP OS 10/18/21 Plan: Cont Pred Acetate Qday OS only do not stop (if IOPs can tolerate). Cont AFTs QID. Monitor. F/u 6mths.

## 2022-11-26 NOTE — IMPRESSION/PLAN
Impression: Primary iridocyclitis, right eye: H20.011. Plan: Discussed diagnosis in detail with patient. Denies arthritis/ RA. Start Prednisolone Acetate QID OD (ERx sent). Will continue to observe. show

## 2023-01-31 ENCOUNTER — OFFICE VISIT (OUTPATIENT)
Dept: URBAN - METROPOLITAN AREA CLINIC 44 | Facility: CLINIC | Age: 78
End: 2023-01-31
Payer: MEDICARE

## 2023-01-31 DIAGNOSIS — H17.12 CENTRAL CORNEAL OPACITY, LEFT EYE: ICD-10-CM

## 2023-01-31 DIAGNOSIS — Z94.7 CORNEAL TRANSPLANT STATUS: Primary | ICD-10-CM

## 2023-01-31 DIAGNOSIS — H40.2213 CHRONIC ANGLE-CLOSURE GLAUCOMA, RIGHT EYE, SEVERE STAGE: ICD-10-CM

## 2023-01-31 PROCEDURE — 92025 CPTRIZED CORNEAL TOPOGRAPHY: CPT | Performed by: OPHTHALMOLOGY

## 2023-01-31 PROCEDURE — 99213 OFFICE O/P EST LOW 20 MIN: CPT | Performed by: OPHTHALMOLOGY

## 2023-01-31 ASSESSMENT — KERATOMETRY
OD: 45.13
OS: 42.13

## 2023-01-31 ASSESSMENT — INTRAOCULAR PRESSURE
OS: 17
OD: 16

## 2023-01-31 ASSESSMENT — VISUAL ACUITY
OD: 20/60
OS: 20/50

## 2023-01-31 NOTE — IMPRESSION/PLAN
Impression: Corneal transplant status: Z94.7.
- s/p PKP OS 10/18/21 Plan: POY#1, doing well. 2 sutures removed, oflox placed. Cont oflox TID x 3 days then d/c. Cont pred qd, do not stop (if IOPs can tolerate) Cont ATs QID Cont aggressive lubrication Doing well of ACV, monitor while off.

## 2023-01-31 NOTE — IMPRESSION/PLAN
Impression: Chronic angle-closure glaucoma, right eye, severe stage: R83.3999. LPI OU Hx of ACG OD
CCT average OU
IOP/ONH  stable OU Plan: Continue using current medication(s). Brimonidine TID OD, Timolol BID OD and Latanoprost QHS OD. Under the care of Dr. Christin Galeas

## 2023-03-09 ENCOUNTER — OFFICE VISIT (OUTPATIENT)
Dept: URBAN - METROPOLITAN AREA CLINIC 17 | Facility: CLINIC | Age: 78
End: 2023-03-09
Payer: MEDICARE

## 2023-03-09 DIAGNOSIS — Z96.1 PRESENCE OF PSEUDOPHAKIA: ICD-10-CM

## 2023-03-09 DIAGNOSIS — Z94.7 CORNEAL TRANSPLANT STATUS: ICD-10-CM

## 2023-03-09 DIAGNOSIS — H16.221 KERATOCONJUNCTIVITIS SICCA, NOT SPECIFIED AS SJÖGREN'S, RIGHT EYE: ICD-10-CM

## 2023-03-09 DIAGNOSIS — H40.2213 CHRONIC ANGLE-CLOSURE GLAUCOMA, RIGHT EYE, SEVERE STAGE: Primary | ICD-10-CM

## 2023-03-09 PROCEDURE — 99214 OFFICE O/P EST MOD 30 MIN: CPT | Performed by: OPTOMETRIST

## 2023-03-09 PROCEDURE — 92133 CPTRZD OPH DX IMG PST SGM ON: CPT | Performed by: OPTOMETRIST

## 2023-03-09 ASSESSMENT — INTRAOCULAR PRESSURE
OS: 13
OD: 20

## 2023-03-09 NOTE — IMPRESSION/PLAN
Impression: Chronic angle-closure glaucoma, right eye, severe stage: Y71.3895. Plan: Continue using current medication(s).  Brimonidine TID OD, Timolol BID OD and Latanoprost QHS OD.

## 2023-03-09 NOTE — IMPRESSION/PLAN
Impression: Corneal transplant status: Z94.7.
- s/p PKP OS 10/18/21 Plan: Discussed diagnosis in detail with patient. No treatment is required at this time. Will continue to observe condition and or symptoms. Reassured patient of current condition and treatment.

## 2023-03-28 ENCOUNTER — OFFICE VISIT (OUTPATIENT)
Dept: URBAN - METROPOLITAN AREA CLINIC 44 | Facility: CLINIC | Age: 78
End: 2023-03-28
Payer: MEDICARE

## 2023-03-28 DIAGNOSIS — H17.12 CENTRAL CORNEAL OPACITY, LEFT EYE: ICD-10-CM

## 2023-03-28 DIAGNOSIS — Z94.7 CORNEAL TRANSPLANT STATUS: Primary | ICD-10-CM

## 2023-03-28 DIAGNOSIS — H40.2213 CHRONIC ANGLE-CLOSURE GLAUCOMA, RIGHT EYE, SEVERE STAGE: ICD-10-CM

## 2023-03-28 PROCEDURE — 92025 CPTRIZED CORNEAL TOPOGRAPHY: CPT | Performed by: OPHTHALMOLOGY

## 2023-03-28 PROCEDURE — 99213 OFFICE O/P EST LOW 20 MIN: CPT | Performed by: OPHTHALMOLOGY

## 2023-03-28 ASSESSMENT — VISUAL ACUITY: OS: 20/100

## 2023-03-28 ASSESSMENT — INTRAOCULAR PRESSURE
OD: 14
OS: 14

## 2023-03-28 NOTE — IMPRESSION/PLAN
Impression: Chronic angle-closure glaucoma, right eye, severe stage: X76.5375. LPI OU Hx of ACG OD
CCT average OU
IOP/ONH  stable OU Plan: Continue using current medication(s). Brimonidine TID OD, Timolol BID OD and Latanoprost QHS OD. Under the care of Dr. Julia Gómez

## 2023-05-09 ENCOUNTER — OFFICE VISIT (OUTPATIENT)
Dept: URBAN - METROPOLITAN AREA CLINIC 44 | Facility: CLINIC | Age: 78
End: 2023-05-09
Payer: MEDICARE

## 2023-05-09 DIAGNOSIS — H17.12 CENTRAL CORNEAL OPACITY, LEFT EYE: ICD-10-CM

## 2023-05-09 DIAGNOSIS — Z94.7 CORNEAL TRANSPLANT STATUS: Primary | ICD-10-CM

## 2023-05-09 DIAGNOSIS — H40.2213 CHRONIC ANGLE-CLOSURE GLAUCOMA, RIGHT EYE, SEVERE STAGE: ICD-10-CM

## 2023-05-09 PROCEDURE — 92025 CPTRIZED CORNEAL TOPOGRAPHY: CPT | Performed by: OPHTHALMOLOGY

## 2023-05-09 PROCEDURE — 99213 OFFICE O/P EST LOW 20 MIN: CPT | Performed by: OPHTHALMOLOGY

## 2023-05-09 ASSESSMENT — INTRAOCULAR PRESSURE
OS: 19
OD: 18

## 2023-05-09 ASSESSMENT — VISUAL ACUITY: OS: 20/100

## 2023-05-09 NOTE — IMPRESSION/PLAN
Impression: Corneal transplant status: Z94.7.
- s/p PKP OS 10/18/21 Plan: POY#1.5, doing well. 2 sutures removed, oflox placed. Cont oflox TID x 3 days then d/c. Cont pred qd, do not stop (if IOPs can tolerate) Cont ATs QID Cont aggressive lubrication Doing well of ACV, monitor while off.

## 2023-05-09 NOTE — IMPRESSION/PLAN
Impression: Chronic angle-closure glaucoma, right eye, severe stage: C41.1054. LPI OU Hx of ACG OD
CCT average OU
IOP/ONH  stable OU Plan: Continue using current medication(s). Brimonidine TID OD, Timolol BID OD and Latanoprost QHS OD. Under the care of Dr. Ocampo Shows

## 2023-08-15 ENCOUNTER — OFFICE VISIT (OUTPATIENT)
Dept: URBAN - METROPOLITAN AREA CLINIC 44 | Facility: CLINIC | Age: 78
End: 2023-08-15
Payer: MEDICARE

## 2023-08-15 DIAGNOSIS — H40.2213 CHRONIC ANGLE-CLOSURE GLAUCOMA, RIGHT EYE, SEVERE STAGE: ICD-10-CM

## 2023-08-15 DIAGNOSIS — Z94.7 CORNEAL TRANSPLANT STATUS: Primary | ICD-10-CM

## 2023-08-15 DIAGNOSIS — H17.12 CENTRAL CORNEAL OPACITY, LEFT EYE: ICD-10-CM

## 2023-08-15 PROCEDURE — 92025 CPTRIZED CORNEAL TOPOGRAPHY: CPT | Performed by: OPHTHALMOLOGY

## 2023-08-15 PROCEDURE — 99213 OFFICE O/P EST LOW 20 MIN: CPT | Performed by: OPHTHALMOLOGY

## 2023-08-15 ASSESSMENT — INTRAOCULAR PRESSURE
OD: 12
OS: 15

## 2023-08-15 ASSESSMENT — KERATOMETRY
OS: 42.63
OD: 44.75

## 2023-08-15 ASSESSMENT — VISUAL ACUITY: OS: 20/50

## 2023-09-12 ENCOUNTER — OFFICE VISIT (OUTPATIENT)
Dept: URBAN - METROPOLITAN AREA CLINIC 44 | Facility: CLINIC | Age: 78
End: 2023-09-12
Payer: MEDICARE

## 2023-09-12 DIAGNOSIS — H17.12 CENTRAL CORNEAL OPACITY, LEFT EYE: ICD-10-CM

## 2023-09-12 DIAGNOSIS — Z94.7 CORNEAL TRANSPLANT STATUS: Primary | ICD-10-CM

## 2023-09-12 DIAGNOSIS — H40.2213 CHRONIC ANGLE-CLOSURE GLAUCOMA, RIGHT EYE, SEVERE STAGE: ICD-10-CM

## 2023-09-12 PROCEDURE — 99213 OFFICE O/P EST LOW 20 MIN: CPT | Performed by: OPHTHALMOLOGY

## 2023-09-12 PROCEDURE — 92025 CPTRIZED CORNEAL TOPOGRAPHY: CPT | Performed by: OPHTHALMOLOGY

## 2023-09-12 RX ORDER — PREDNISOLONE ACETATE 10 MG/ML
1 % SUSPENSION/ DROPS OPHTHALMIC
Qty: 10 | Refills: 5 | Status: ACTIVE
Start: 2023-09-12

## 2023-09-12 RX ORDER — LATANOPROST 50 UG/ML
0.005 % SOLUTION OPHTHALMIC
Qty: 5 | Refills: 1 | Status: ACTIVE
Start: 2023-09-12

## 2023-09-12 RX ORDER — BRIMONIDINE TARTRATE 2 MG/ML
0.2 % SOLUTION/ DROPS OPHTHALMIC
Qty: 15 | Refills: 0 | Status: ACTIVE
Start: 2023-09-12

## 2023-09-12 RX ORDER — TIMOLOL MALEATE 5 MG/ML
0.5 % SOLUTION/ DROPS OPHTHALMIC
Qty: 1 | Refills: 0 | Status: ACTIVE
Start: 2023-09-12

## 2023-09-12 ASSESSMENT — KERATOMETRY
OD: 43.88
OS: 43.38

## 2023-09-12 ASSESSMENT — INTRAOCULAR PRESSURE
OS: 13
OD: 14

## 2023-09-12 ASSESSMENT — VISUAL ACUITY: OS: 20/50

## 2023-09-18 ENCOUNTER — OFFICE VISIT (OUTPATIENT)
Dept: URBAN - METROPOLITAN AREA CLINIC 17 | Facility: CLINIC | Age: 78
End: 2023-09-18
Payer: MEDICARE

## 2023-09-18 DIAGNOSIS — H40.2213 CHRONIC ANGLE-CLOSURE GLAUCOMA, RIGHT EYE, SEVERE STAGE: Primary | ICD-10-CM

## 2023-09-18 PROCEDURE — 99213 OFFICE O/P EST LOW 20 MIN: CPT | Performed by: OPTOMETRIST

## 2023-09-18 PROCEDURE — 92083 EXTENDED VISUAL FIELD XM: CPT | Performed by: OPTOMETRIST

## 2023-09-18 RX ORDER — TIMOLOL MALEATE 5 MG/ML
0.5 % SOLUTION/ DROPS OPHTHALMIC
Qty: 1 | Refills: 0 | Status: INACTIVE
Start: 2023-09-18 | End: 2023-09-18

## 2023-09-18 RX ORDER — BRIMONIDINE TARTRATE 2 MG/ML
0.2 % SOLUTION/ DROPS OPHTHALMIC
Qty: 15 | Refills: 1 | Status: ACTIVE
Start: 2023-09-18

## 2023-09-18 RX ORDER — TIMOLOL MALEATE 5 MG/ML
0.5 % SOLUTION/ DROPS OPHTHALMIC
Qty: 15 | Refills: 1 | Status: ACTIVE
Start: 2023-09-18

## 2023-09-18 RX ORDER — LATANOPROST 50 UG/ML
0.005 % SOLUTION OPHTHALMIC
Qty: 7.5 | Refills: 1 | Status: ACTIVE
Start: 2023-09-18

## 2023-09-18 ASSESSMENT — INTRAOCULAR PRESSURE
OD: 17
OS: 15

## 2024-05-13 ENCOUNTER — OFFICE VISIT (OUTPATIENT)
Dept: URBAN - METROPOLITAN AREA CLINIC 44 | Facility: CLINIC | Age: 79
End: 2024-05-13
Payer: MEDICARE

## 2024-05-13 DIAGNOSIS — Z94.7 CORNEAL TRANSPLANT STATUS: Primary | ICD-10-CM

## 2024-05-13 DIAGNOSIS — H10.45 OTHER CHRONIC ALLERGIC CONJUNCTIVITIS: ICD-10-CM

## 2024-05-13 PROCEDURE — 99212 OFFICE O/P EST SF 10 MIN: CPT | Performed by: OPTOMETRIST

## 2024-05-13 ASSESSMENT — INTRAOCULAR PRESSURE
OS: 10
OD: 10

## 2024-08-27 ENCOUNTER — OFFICE VISIT (OUTPATIENT)
Dept: URBAN - METROPOLITAN AREA CLINIC 17 | Facility: CLINIC | Age: 79
End: 2024-08-27
Payer: MEDICARE

## 2024-08-27 DIAGNOSIS — H43.813 VITREOUS DEGENERATION, BILATERAL: ICD-10-CM

## 2024-08-27 DIAGNOSIS — Z94.7 CORNEAL TRANSPLANT STATUS: ICD-10-CM

## 2024-08-27 DIAGNOSIS — H40.2213 CHRONIC ANGLE-CLOSURE GLAUCOMA, RIGHT EYE, SEVERE STAGE: Primary | ICD-10-CM

## 2024-08-27 DIAGNOSIS — Z96.1 PRESENCE OF PSEUDOPHAKIA: ICD-10-CM

## 2024-08-27 PROCEDURE — 92014 COMPRE OPH EXAM EST PT 1/>: CPT | Performed by: OPTOMETRIST

## 2024-08-27 PROCEDURE — 92133 CPTRZD OPH DX IMG PST SGM ON: CPT | Performed by: OPTOMETRIST

## 2024-08-27 RX ORDER — LATANOPROST 50 UG/ML
0.005 % SOLUTION OPHTHALMIC
Qty: 7.5 | Refills: 1 | Status: ACTIVE
Start: 2024-08-27

## 2024-08-27 RX ORDER — PREDNISOLONE ACETATE 10 MG/ML
1 % SUSPENSION/ DROPS OPHTHALMIC
Qty: 10 | Refills: 5 | Status: ACTIVE
Start: 2024-08-27

## 2024-08-27 RX ORDER — BRIMONIDINE TARTRATE 2 MG/ML
0.2 % SOLUTION/ DROPS OPHTHALMIC
Qty: 15 | Refills: 1 | Status: ACTIVE
Start: 2024-08-27

## 2024-08-27 ASSESSMENT — INTRAOCULAR PRESSURE
OD: 14
OS: 11

## 2024-09-13 ENCOUNTER — OFFICE VISIT (OUTPATIENT)
Dept: URBAN - METROPOLITAN AREA CLINIC 17 | Facility: CLINIC | Age: 79
End: 2024-09-13
Payer: MEDICARE

## 2024-09-13 DIAGNOSIS — H04.123 DRY EYE SYNDROME OF BILATERAL LACRIMAL GLANDS: ICD-10-CM

## 2024-09-13 DIAGNOSIS — H00.024 HORDEOLUM INTERNUM LEFT UPPER EYELID: Primary | ICD-10-CM

## 2024-09-13 PROCEDURE — 99214 OFFICE O/P EST MOD 30 MIN: CPT

## 2024-09-13 RX ORDER — CEPHALEXIN 500 MG/1
500 MG CAPSULE ORAL
Qty: 24 | Refills: 0 | Status: ACTIVE
Start: 2024-09-13

## 2024-09-13 ASSESSMENT — INTRAOCULAR PRESSURE
OS: 21
OD: 17

## 2024-10-02 ENCOUNTER — OFFICE VISIT (OUTPATIENT)
Dept: URBAN - METROPOLITAN AREA CLINIC 17 | Facility: CLINIC | Age: 79
End: 2024-10-02
Payer: MEDICARE

## 2024-10-02 DIAGNOSIS — Z94.7 CORNEAL TRANSPLANT STATUS: ICD-10-CM

## 2024-10-02 DIAGNOSIS — H00.14 CHALAZION LEFT UPPER EYELID: Primary | ICD-10-CM

## 2024-10-02 PROCEDURE — 99213 OFFICE O/P EST LOW 20 MIN: CPT | Performed by: OPTOMETRIST

## 2024-10-02 ASSESSMENT — INTRAOCULAR PRESSURE
OD: 15
OS: 12

## 2024-12-31 ENCOUNTER — OFFICE VISIT (OUTPATIENT)
Dept: URBAN - METROPOLITAN AREA CLINIC 44 | Facility: CLINIC | Age: 79
End: 2024-12-31
Payer: MEDICARE

## 2024-12-31 DIAGNOSIS — H40.2213 CHRONIC ANGLE-CLOSURE GLAUCOMA, RIGHT EYE, SEVERE STAGE: ICD-10-CM

## 2024-12-31 DIAGNOSIS — Z94.7 CORNEAL TRANSPLANT STATUS: Primary | ICD-10-CM

## 2024-12-31 PROCEDURE — 92025 CPTRIZED CORNEAL TOPOGRAPHY: CPT | Performed by: OPHTHALMOLOGY

## 2024-12-31 PROCEDURE — 99213 OFFICE O/P EST LOW 20 MIN: CPT | Performed by: OPHTHALMOLOGY

## 2024-12-31 ASSESSMENT — INTRAOCULAR PRESSURE
OS: 15
OD: 16

## 2025-02-18 ENCOUNTER — OFFICE VISIT (OUTPATIENT)
Dept: URBAN - METROPOLITAN AREA CLINIC 17 | Facility: CLINIC | Age: 80
End: 2025-02-18
Payer: MEDICARE

## 2025-02-18 DIAGNOSIS — Z94.7 CORNEAL TRANSPLANT STATUS: ICD-10-CM

## 2025-02-18 DIAGNOSIS — H40.2213 CHRONIC ANGLE-CLOSURE GLAUCOMA, RIGHT EYE, SEVERE STAGE: Primary | ICD-10-CM

## 2025-02-18 PROCEDURE — 99213 OFFICE O/P EST LOW 20 MIN: CPT | Performed by: OPTOMETRIST

## 2025-02-18 PROCEDURE — 76514 ECHO EXAM OF EYE THICKNESS: CPT | Performed by: OPTOMETRIST

## 2025-02-18 PROCEDURE — 92083 EXTENDED VISUAL FIELD XM: CPT | Performed by: OPTOMETRIST

## 2025-02-18 RX ORDER — BRIMONIDINE TARTRATE 2 MG/ML
0.2 % SOLUTION/ DROPS OPHTHALMIC
Qty: 15 | Refills: 1 | Status: ACTIVE
Start: 2025-02-18

## 2025-02-18 RX ORDER — LATANOPROST 50 UG/ML
0.005 % SOLUTION OPHTHALMIC
Qty: 7.5 | Refills: 1 | Status: ACTIVE
Start: 2025-02-18

## 2025-02-18 RX ORDER — TIMOLOL MALEATE 5 MG/ML
0.5 % SOLUTION/ DROPS OPHTHALMIC
Qty: 15 | Refills: 1 | Status: ACTIVE
Start: 2025-02-18

## 2025-02-18 ASSESSMENT — INTRAOCULAR PRESSURE
OS: 20
OD: 20

## 2025-03-31 ENCOUNTER — OFFICE VISIT (OUTPATIENT)
Dept: URBAN - METROPOLITAN AREA CLINIC 44 | Facility: CLINIC | Age: 80
End: 2025-03-31
Payer: MEDICARE

## 2025-03-31 DIAGNOSIS — Z94.7 CORNEAL TRANSPLANT STATUS: Primary | ICD-10-CM

## 2025-03-31 DIAGNOSIS — H40.2213 CHRONIC ANGLE-CLOSURE GLAUCOMA, RIGHT EYE, SEVERE STAGE: ICD-10-CM

## 2025-03-31 PROCEDURE — 99213 OFFICE O/P EST LOW 20 MIN: CPT | Performed by: OPHTHALMOLOGY

## 2025-03-31 RX ORDER — PREDNISOLONE ACETATE 10 MG/ML
1 % SUSPENSION/ DROPS OPHTHALMIC
Qty: 10 | Refills: 11 | Status: ACTIVE
Start: 2025-03-31

## 2025-03-31 ASSESSMENT — INTRAOCULAR PRESSURE
OD: 17
OS: 17